# Patient Record
Sex: FEMALE | URBAN - METROPOLITAN AREA
[De-identification: names, ages, dates, MRNs, and addresses within clinical notes are randomized per-mention and may not be internally consistent; named-entity substitution may affect disease eponyms.]

---

## 2022-07-28 ENCOUNTER — OFFICE VISIT (OUTPATIENT)
Dept: URBAN - METROPOLITAN AREA CLINIC 3 | Facility: CLINIC | Age: 57
End: 2022-07-28
Payer: MEDICAID

## 2022-07-28 DIAGNOSIS — H26.493 OTHER SECONDARY CATARACT, BILATERAL: ICD-10-CM

## 2022-07-28 DIAGNOSIS — H52.4 PRESBYOPIA: ICD-10-CM

## 2022-07-28 DIAGNOSIS — Z96.1 PRESENCE OF INTRAOCULAR LENS: ICD-10-CM

## 2022-07-28 DIAGNOSIS — H43.393 OTHER VITREOUS OPACITIES, BILATERAL: Primary | ICD-10-CM

## 2022-07-28 PROCEDURE — 92015 DETERMINE REFRACTIVE STATE: CPT | Performed by: OPTOMETRIST

## 2022-07-28 PROCEDURE — 92014 COMPRE OPH EXAM EST PT 1/>: CPT | Performed by: OPTOMETRIST

## 2022-07-28 ASSESSMENT — INTRAOCULAR PRESSURE
OD: 12
OS: 15

## 2022-07-28 ASSESSMENT — VISUAL ACUITY
OS: 20/20
OD: 20/20

## 2022-07-28 NOTE — IMPRESSION/PLAN
Impression: Other secondary cataract, bilateral: H26.493. Plan: At this time does not appear visually significant, so will hold off on intervention. Patient advised to contact us for any decrease vision, glare, or other visual problems.

## 2022-07-28 NOTE — IMPRESSION/PLAN
Impression: Presbyopia: H52.4. Plan: Minimal refraction needed. Recommend only use of over the counter reading glasses.